# Patient Record
Sex: FEMALE | Race: WHITE
[De-identification: names, ages, dates, MRNs, and addresses within clinical notes are randomized per-mention and may not be internally consistent; named-entity substitution may affect disease eponyms.]

---

## 2017-01-27 ENCOUNTER — HOSPITAL ENCOUNTER (OUTPATIENT)
Dept: HOSPITAL 45 - C.LAB1850 | Age: 57
Discharge: HOME | End: 2017-01-27
Attending: INTERNAL MEDICINE
Payer: COMMERCIAL

## 2017-01-27 DIAGNOSIS — I10: ICD-10-CM

## 2017-01-27 DIAGNOSIS — M35.00: ICD-10-CM

## 2017-01-27 DIAGNOSIS — Z11.59: ICD-10-CM

## 2017-01-27 DIAGNOSIS — F41.8: Primary | ICD-10-CM

## 2017-01-27 LAB
ALBUMIN/GLOB SERPL: 1.1 {RATIO} (ref 0.9–2)
ALP SERPL-CCNC: 123 U/L (ref 45–117)
ALT SERPL-CCNC: 39 U/L (ref 12–78)
ANION GAP SERPL CALC-SCNC: 9 MMOL/L (ref 3–11)
AST SERPL-CCNC: 18 U/L (ref 15–37)
BASOPHILS # BLD: 0.01 K/UL (ref 0–0.2)
BASOPHILS NFR BLD: 0.3 %
BUN SERPL-MCNC: 11 MG/DL (ref 7–18)
BUN/CREAT SERPL: 19.6 (ref 10–20)
CALCIUM SERPL-MCNC: 8.7 MG/DL (ref 8.5–10.1)
CHLORIDE SERPL-SCNC: 103 MMOL/L (ref 98–107)
CO2 SERPL-SCNC: 27 MMOL/L (ref 21–32)
COMPLETE: YES
CREAT SERPL-MCNC: 0.58 MG/DL (ref 0.6–1.2)
EOSINOPHIL NFR BLD AUTO: 162 K/UL (ref 130–400)
GLOBULIN SER-MCNC: 3.5 GM/DL (ref 2.5–4)
GLUCOSE SERPL-MCNC: 109 MG/DL (ref 70–99)
HCT VFR BLD CALC: 39 % (ref 37–47)
IG%: 0 %
IMM GRANULOCYTES NFR BLD AUTO: 23.6 %
LYMPHOCYTES # BLD: 0.86 K/UL (ref 1.2–3.4)
MCH RBC QN AUTO: 27.1 PG (ref 25–34)
MCHC RBC AUTO-ENTMCNC: 34.1 G/DL (ref 32–36)
MCV RBC AUTO: 79.4 FL (ref 80–100)
MONOCYTES NFR BLD: 8.2 %
NEUTROPHILS # BLD AUTO: 1.1 %
NEUTROPHILS NFR BLD AUTO: 66.8 %
PMV BLD AUTO: 9.7 FL (ref 7.4–10.4)
POTASSIUM SERPL-SCNC: 3.4 MMOL/L (ref 3.5–5.1)
RBC # BLD AUTO: 4.91 M/UL (ref 4.2–5.4)
SODIUM SERPL-SCNC: 139 MMOL/L (ref 136–145)
TSH SERPL-ACNC: 1.68 UIU/ML (ref 0.3–4.5)
WBC # BLD AUTO: 3.65 K/UL (ref 4.8–10.8)

## 2017-05-03 ENCOUNTER — HOSPITAL ENCOUNTER (OUTPATIENT)
Dept: HOSPITAL 45 - C.PAPS | Age: 57
Discharge: HOME | End: 2017-05-03
Attending: OBSTETRICS & GYNECOLOGY
Payer: COMMERCIAL

## 2017-05-03 DIAGNOSIS — Z90.710: ICD-10-CM

## 2017-05-03 DIAGNOSIS — Z12.4: Primary | ICD-10-CM

## 2017-07-11 ENCOUNTER — HOSPITAL ENCOUNTER (OUTPATIENT)
Dept: HOSPITAL 45 - C.MAMM | Age: 57
Discharge: HOME | End: 2017-07-11
Attending: OBSTETRICS & GYNECOLOGY
Payer: COMMERCIAL

## 2017-07-11 DIAGNOSIS — Z12.31: Primary | ICD-10-CM

## 2017-07-11 DIAGNOSIS — N64.4: ICD-10-CM

## 2017-07-12 NOTE — MAMMOGRAPHY REPORT
BILATERAL DIGITAL SCREENING MAMMOGRAM TOMOSYNTHESIS WITH CAD: 7/11/2017

CLINICAL HISTORY: Routine screening.  Patient reported bilateral breast tenderness and fullness for 1
 week during this screening examination.  





TECHNIQUE:  Breast tomosynthesis in addition to standard 2D mammography was performed. Current study 
was also evaluated with a Computer Aided Detection (CAD) system.  



COMPARISON: Comparison is made to exams dated:  7/8/2016 mammogram, 7/7/2015 mammogram, 2/10/2016 [facility]
mogram, 7/1/2013 mammogram, 6/27/2012 mammogram, and 6/23/2011 mammogram - [facility]
er.   



BREAST COMPOSITION:  There are scattered areas of fibroglandular density in both breasts.  



FINDINGS: There are multiple bilateral circumscribed masses scattered in the breasts, which is a typi
lynn benign mammographic pattern.  There are a few benign-appearing punctate microcalcifications.  N
o suspicious spiculated or irregular mass, architectural distortion or cluster of new, suspicious cristal
rocalcifications is seen.  



IMPRESSION:  ACR BI-RADS CATEGORY 1: NEGATIVE

1.  There is no mammographic evidence of malignancy. A 1 year screening mammogram is recommended.

2.  Clinical follow-up is recommended for the reported of bilateral breast tenderness and fullness.

  The patient will receive written notification of the results.  





Approximately 10% of breast cancers are not detected with mammography. A negative mammographic report
 should not delay biopsy if a clinically suggestive mass is present.



Jade Ni M.D.          

ay/:7/11/2017 17:20:07  



Imaging Technologist: Liberty SABILLON(KENDAL)(TAVO), [facility]

letter sent: Normal 1/2  

BI-RADS Code: ACR BI-RADS Category 1: Negative

## 2017-07-30 ENCOUNTER — HOSPITAL ENCOUNTER (OUTPATIENT)
Dept: HOSPITAL 45 - C.LABSPEC | Age: 57
End: 2017-07-30
Attending: INTERNAL MEDICINE
Payer: COMMERCIAL

## 2017-07-30 DIAGNOSIS — Z85.038: ICD-10-CM

## 2017-07-30 DIAGNOSIS — M35.1: ICD-10-CM

## 2017-07-30 DIAGNOSIS — E53.8: ICD-10-CM

## 2017-07-30 DIAGNOSIS — E55.9: ICD-10-CM

## 2017-07-30 DIAGNOSIS — H04.129: Primary | ICD-10-CM

## 2017-07-30 DIAGNOSIS — R76.8: ICD-10-CM

## 2017-07-30 DIAGNOSIS — R53.83: ICD-10-CM

## 2017-07-30 DIAGNOSIS — Z79.1: ICD-10-CM

## 2017-07-30 LAB
ALBUMIN/GLOB SERPL: 1.2 {RATIO} (ref 0.9–2)
ALP SERPL-CCNC: 117 U/L (ref 45–117)
ALT SERPL-CCNC: 54 U/L (ref 12–78)
ANION GAP SERPL CALC-SCNC: 6 MMOL/L (ref 3–11)
AST SERPL-CCNC: 29 U/L (ref 15–37)
BUN SERPL-MCNC: 12 MG/DL (ref 7–18)
BUN/CREAT SERPL: 21.2 (ref 10–20)
CALCIUM SERPL-MCNC: 8.7 MG/DL (ref 8.5–10.1)
CHLORIDE SERPL-SCNC: 105 MMOL/L (ref 98–107)
CHOLEST/HDLC SERPL: 4.6 {RATIO}
CO2 SERPL-SCNC: 29 MMOL/L (ref 21–32)
CREAT SERPL-MCNC: 0.55 MG/DL (ref 0.6–1.2)
FERRITIN SERPL-MCNC: 99.8 NG/ML (ref 8–388)
GLOBULIN SER-MCNC: 3.3 GM/DL (ref 2.5–4)
GLUCOSE SERPL-MCNC: 122 MG/DL (ref 70–99)
GLUCOSE UR QL: 39 MG/DL
KETONES UR QL STRIP: 108 MG/DL
NITRITE UR QL STRIP: 166 MG/DL (ref 0–150)
PH UR: 180 MG/DL (ref 0–200)
POTASSIUM SERPL-SCNC: 3.6 MMOL/L (ref 3.5–5.1)
SODIUM SERPL-SCNC: 140 MMOL/L (ref 136–145)
TIBC SERPL-MCNC: 353 MCG/DL (ref 250–450)
VERY LOW DENSITY LIPOPROT CALC: 33 MG/DL

## 2017-08-03 LAB
ANA TITR SER: (no result) TITER
ANTI-CENTROMERE AB: (no result) AI
DNA DS CRITHIDIA: NEGATIVE
ENA SM AB SER-ACNC: (no result) AI
ENA SS-A IGG SER QL IA: (no result) AI
ENA SS-B AB SER-ACNC: (no result) AI

## 2017-08-24 ENCOUNTER — HOSPITAL ENCOUNTER (OUTPATIENT)
Dept: HOSPITAL 45 - C.LAB | Age: 57
Discharge: HOME | End: 2017-08-24
Attending: INTERNAL MEDICINE
Payer: COMMERCIAL

## 2017-08-24 DIAGNOSIS — R73.01: Primary | ICD-10-CM

## 2017-09-27 ENCOUNTER — HOSPITAL ENCOUNTER (OUTPATIENT)
Dept: HOSPITAL 45 - C.RAD1850 | Age: 57
Discharge: HOME | End: 2017-09-27
Attending: INTERNAL MEDICINE
Payer: COMMERCIAL

## 2017-09-27 DIAGNOSIS — M54.5: Primary | ICD-10-CM

## 2017-10-08 ENCOUNTER — HOSPITAL ENCOUNTER (OUTPATIENT)
Dept: HOSPITAL 45 - C.LAB | Age: 57
Discharge: HOME | End: 2017-10-08
Attending: PHYSICIAN ASSISTANT
Payer: COMMERCIAL

## 2017-10-08 DIAGNOSIS — E11.9: Primary | ICD-10-CM

## 2018-01-27 ENCOUNTER — HOSPITAL ENCOUNTER (OUTPATIENT)
Dept: HOSPITAL 45 - C.LAB | Age: 58
Discharge: HOME | End: 2018-01-27
Attending: INTERNAL MEDICINE
Payer: COMMERCIAL

## 2018-01-27 DIAGNOSIS — R76.8: ICD-10-CM

## 2018-01-27 DIAGNOSIS — E11.9: Primary | ICD-10-CM

## 2018-01-27 DIAGNOSIS — Z79.1: ICD-10-CM

## 2018-01-27 DIAGNOSIS — R53.83: ICD-10-CM

## 2018-01-27 DIAGNOSIS — M35.00: ICD-10-CM

## 2018-01-27 LAB
ALBUMIN SERPL-MCNC: 3.8 GM/DL (ref 3.4–5)
ALP SERPL-CCNC: 125 U/L (ref 45–117)
ALT SERPL-CCNC: 40 U/L (ref 12–78)
AST SERPL-CCNC: 21 U/L (ref 15–37)
BASOPHILS # BLD: 0.01 K/UL (ref 0–0.2)
BASOPHILS NFR BLD: 0.3 %
CREAT SERPL-MCNC: 0.67 MG/DL (ref 0.6–1.2)
EOS ABS #: 0.05 K/UL (ref 0–0.5)
EOSINOPHIL NFR BLD AUTO: 131 K/UL (ref 130–400)
HBA1C MFR BLD: 6.2 % (ref 4.5–5.6)
HCT VFR BLD CALC: 42.9 % (ref 37–47)
HGB BLD-MCNC: 14.3 G/DL (ref 12–16)
IG#: 0.01 K/UL (ref 0–0.02)
IMM GRANULOCYTES NFR BLD AUTO: 32 %
LYMPHOCYTES # BLD: 1.18 K/UL (ref 1.2–3.4)
MCH RBC QN AUTO: 26.8 PG (ref 25–34)
MCHC RBC AUTO-ENTMCNC: 33.3 G/DL (ref 32–36)
MCV RBC AUTO: 80.3 FL (ref 80–100)
MONO ABS #: 0.25 K/UL (ref 0.11–0.59)
MONOCYTES NFR BLD: 6.8 %
NEUT ABS #: 2.19 K/UL (ref 1.4–6.5)
NEUTROPHILS # BLD AUTO: 1.4 %
NEUTROPHILS NFR BLD AUTO: 59.2 %
PMV BLD AUTO: 9.6 FL (ref 7.4–10.4)
PROT SERPL-MCNC: 7.5 GM/DL (ref 6.4–8.2)
RED CELL DISTRIBUTION WIDTH CV: 14.5 % (ref 11.5–14.5)
RED CELL DISTRIBUTION WIDTH SD: 42.2 FL (ref 36.4–46.3)
WBC # BLD AUTO: 3.69 K/UL (ref 4.8–10.8)